# Patient Record
Sex: MALE | Race: WHITE
[De-identification: names, ages, dates, MRNs, and addresses within clinical notes are randomized per-mention and may not be internally consistent; named-entity substitution may affect disease eponyms.]

---

## 2019-08-01 ENCOUNTER — HOSPITAL ENCOUNTER (EMERGENCY)
Dept: HOSPITAL 91 - FTE | Age: 39
Discharge: HOME | End: 2019-08-01
Payer: MEDICAID

## 2019-08-01 ENCOUNTER — HOSPITAL ENCOUNTER (EMERGENCY)
Dept: HOSPITAL 10 - FTE | Age: 39
Discharge: HOME | End: 2019-08-01
Payer: MEDICAID

## 2019-08-01 VITALS
BODY MASS INDEX: 31.25 KG/M2 | HEIGHT: 63 IN | WEIGHT: 176.37 LBS | BODY MASS INDEX: 31.25 KG/M2 | WEIGHT: 176.37 LBS | HEIGHT: 63 IN

## 2019-08-01 VITALS — DIASTOLIC BLOOD PRESSURE: 78 MMHG | HEART RATE: 70 BPM | RESPIRATION RATE: 18 BRPM | SYSTOLIC BLOOD PRESSURE: 139 MMHG

## 2019-08-01 DIAGNOSIS — M75.01: Primary | ICD-10-CM

## 2019-08-01 PROCEDURE — 99282 EMERGENCY DEPT VISIT SF MDM: CPT

## 2019-08-01 NOTE — ERD
ER Documentation


Chief Complaint


Chief Complaint





RIGHT NON TRAUMATIC  SHOULDER PAIN





HPI


39-year-old male presents to the ED complaining of right shoulder pain x1 week. 


He states that the pain is gotten worse since yesterday.  He denies any 


injuries, accidents or any other trauma.  He states that the pain began suddenly


at night 1 week ago.  He denies any previous problems with his right shoulder.  


He states 10 out of 10 pain that feels like a pressure pain and radiates down to


his elbow.  He states he is used heat pads with mild relief of his pain.  He 


also reports significant decreased range of motion in the right shoulder.  


Patient states he does not have a primary care provider and is unknown without 


any past medical history.  He does not take medicine on a daily basis.  He 


denies any fevers, chills or any other symptoms at this time.





The in demand application was used for translation of [place]ian for this patient





ROS


All systems reviewed and are negative except as per history of present illness.





Medications


Home Meds


Active Scripts


Ibuprofen* (Motrin*) 600 Mg Tab, 600 MG PO Q6H PRN for PAIN AND OR ELEVATED 


TEMP, #30 TAB


   Prov:CELI HARRIS PA-C         8/1/19





Allergies


Allergies:  


Coded Allergies:  


     No Known Allergy (Unverified , 8/1/19)





PMhx/Soc


Medical and Surgical Hx:  pt denies Medical Hx, pt denies Surgical Hx


Hx Alcohol Use:  No


Hx Substance Use:  No


Hx Tobacco Use:  No


Smoking Status:  Never smoker





FmHx


Family History:  No diabetes





Physical Exam


Vitals





Vital Signs


  Date      Temp  Pulse  Resp  B/P (MAP)   Pulse Ox  O2          O2 Flow    FiO2


Time                                                 Delivery    Rate


    8/1/19  98.1     70    18      139/78        99


     08:41                           (98)





Physical Exam


Const:   No acute distress


Head:   Atraumatic 


Neck:               Full range of motion. 


Resp:   Clear to auscultation bilaterally


Cardio:   Regular rate and rhythm, 


Abd:    Soft, non tender, non distended.


Skin:   No petechiae or rashes


Back:   No midline or flank tenderness


Ext:    Right shoulder: No obvious deformities, severely decreased range of 


motion in all directions especially shoulder flexion and extension.  Tenderness 


to the top of the shoulder around the AC joint.  No warmth no swelling


Neur:   Awake and alert


Psych:    Normal Mood and Affect





Procedures/MDM


ED COURSE:


The patient was stable throughout ED course. I kept the patient informed of 


laboratory and diagnostic imaging results throughout the ED course.  








MEDICATIONS GIVEN: 


[None.] 








MEDICAL DECISION MAKING:


Patient is a 39-year-old male complaining of right shoulder pain and decreased 


range of motion x1 week.  I have low suspicion for AC joint dislocation, humerus


 or clavicle fracture, rotator cuff tear, deltoid tear.  On physical exam 


patient had tenderness to the top of the shoulder around the AC joint but had 


significantly severe decreased range of motion he could not move his right arm 


around more than 45 degrees in any direction.  At this time I think patient is 


suffering from a frozen shoulder.  He states he does not have a primary care 


provider and the list of community clinics were given to the patient in order to


 follow-up with and establish care.  I emphasized the importance of getting a 


primary care provider and ask for referral for physical therapy in order to 


regain the range of motion back in his shoulder.  Patient agreed and understood 


to the plan.  He states he will call me in clinic as soon as he leaves the ED.





Vital signs were reviewed. Patient is afebrile. Patient was not hypoxic. Patient


 was hemodynamically stable. Patient was told to follow up with primary care for


 further care and management. 








PRESCRIPTION: motrin





DISCHARGE:


At this time, patient is stable for discharge and outpatient management. I have 


instructed the patient to follow-up with their primary care physician in 1-2 


days. I have discussed with the patient the possibility of needing to see a 


specialist for further workup and imaging studies if symptoms persist. I have 


instructed the patient to promptly return to the ER for any new or worsening s


ymptoms including increased pain, fever, nausea, vomiting, weakness or LOC. The 


patient expressed understanding of and agreement with this plan. All questions 


were answered. Home care instructions were provided. 





Disclaimer: Inadvertent spelling and grammatical errors are likely due to 


EHR/dictation software use and do not reflect on the overall quality of patient 


care. Also, please note that the electronic time recorded on this note does not 


necessarily reflect the actual time of the patient encounter.





Departure


Diagnosis:  


   Primary Impression:  


   Frozen shoulder


   Laterality:  right  Qualified Codes:  M75.01 - Adhesive capsulitis of right 


   shoulder


Condition:  Fair


Patient Instructions:  Understanding Frozen Shoulder, Treating Frozen Shoulder: 


Exercises


Referrals:  


COMMUNITY CLINICS


YOU HAVE RECEIVED A MEDICAL SCREENING EXAM AND THE RESULTS INDICATE THAT YOU DO 


NOT HAVE A CONDITION THAT REQUIRES URGENT TREATMENT IN THE EMERGENCY DEPARTMENT.





FURTHER EVALUATION AND TREATMENT OF YOUR CONDITION CAN WAIT UNTIL YOU ARE SEEN 


IN YOUR DOCTORS OFFICE WITHIN THE NEXT 1-2 DAYS. IT IS YOUR RESPONSIBILITY TO 


MAKE AN APPOINTMENT FOR FOLOW-UP CARE.





IF YOU HAVE A PRIMARY DOCTOR


--you should call your primary doctor and schedule an appointment





IF YOU DO NOT HAVE A PRIMARY DOCTOR YOU CAN CALL OUR PHYSICIAN REFERRAL HOTLINE 


AT


 [phone] 





IF YOU CAN NOT AFFORD TO SEE A PHYSICIAN YOU CAN CHOSE FROM THE FOLLOWING 


[facility]





[facility] [phone] [address] [facility] [phone] [address] [facility] [phone] [address] [facility] [phone] [address] [facility] [phone] [address] [facility]. [facility] [phone] [address] [facility]. [facility]


YOU HAVE RECEIVED A MEDICAL SCREENING EXAM AND THE RESULTS INDICATE THAT YOU DO 


NOT HAVE A CONDITION THAT REQUIRES URGENT TREATMENT IN THE EMERGENCY DEPARTMENT.





FURTHER EVALUATION AND TREATMENT OF YOUR CONDITION CAN WAIT UNTIL YOU ARE SEEN 


IN YOUR DOCTORS OFFICE WITHIN THE NEXT 1-2 DAYS. IT IS YOUR RESPONSIBILITY TO 


MAKE AN APPOINTMENT FOR FOLOW-UP CARE.





IF YOU HAVE A PRIMARY DOCTOR


--you should call your primary doctor and schedule and appointment





IF YOU DO NOT HAVE A PRIMARY DOCTOR YOU CAN CALL OUR PHYSICIAN REFERRAL HOTLINE 


AT [phone].





IF YOU CAN NOT AFFORD TO SEE A PHYSICIAN YOU CAN CHOSE FROM THE FOLLOWING [facility]


 INSTITUTIONS:





[facility]


[address] [place] [address]





[facility]


[address] [place] [address]





[facility]


[address] [place] [address]





[facility]


Urgent Care





7 a.m.- 11 p.m.


Every Day of the Week





NO APPOINTMENT OR AUTHORIZATION NEEDED


[phone]





[facility]


Hours: Mon-Fri


9:00 AM - 5:00 PM





Additional Instructions:  


Call one of the numbers in the packet for a community clinic to call today and 


establish care for referal to Physical therapy for your frozen shoulder





Llame al doctor MAANA y shivani lita RITA PARA DENTRO DE 1-2 RAMIREZ.Dgale a la 


secretaria que nosotros le instruimos hacer esta rita.Avise o llame si hernandez 


condicin se empeora antes de la rita. Regresa aqui si peor o no mejor.











CELI HARRIS PA-C            Aug 1, 2019 09:27